# Patient Record
Sex: FEMALE | Race: OTHER | HISPANIC OR LATINO | URBAN - METROPOLITAN AREA
[De-identification: names, ages, dates, MRNs, and addresses within clinical notes are randomized per-mention and may not be internally consistent; named-entity substitution may affect disease eponyms.]

---

## 2018-02-19 ENCOUNTER — EMERGENCY (EMERGENCY)
Facility: HOSPITAL | Age: 23
LOS: 1 days | Discharge: HOME | End: 2018-02-19
Attending: EMERGENCY MEDICINE
Payer: COMMERCIAL

## 2018-02-19 VITALS
RESPIRATION RATE: 16 BRPM | HEART RATE: 76 BPM | SYSTOLIC BLOOD PRESSURE: 123 MMHG | OXYGEN SATURATION: 98 % | DIASTOLIC BLOOD PRESSURE: 84 MMHG | TEMPERATURE: 99 F

## 2018-02-19 VITALS — HEIGHT: 63 IN | WEIGHT: 130.07 LBS

## 2018-02-19 PROCEDURE — 99283 EMERGENCY DEPT VISIT LOW MDM: CPT

## 2018-02-19 PROCEDURE — 99282 EMERGENCY DEPT VISIT SF MDM: CPT

## 2018-02-19 NOTE — ED PROVIDER NOTE - OBJECTIVE STATEMENT
21 yo F with no Past Medical History that presents with panic attack and anxiety. Pt reports that she has had these issues that were undx'd as she never sought help for them. Today was having stress with mom and was on train then started having diff breathing and 21 yo F with no Past Medical History that presents with panic attack and anxiety. Pt reports that she has had these issues that were undx'd as she never sought help for them. Today was having stress with mom and was on train then started having diff breathing and started feeling impending doom so came to ED, since arrival symptoms resolved including diff breathing and symptoms of impending doom. mom with anxiety and pt about to graduate college and doesn't have plans for afterwards so under a lot of stress. No chest pain, SOB, abd pain, N/V/D, fevers, chills, headache, vision/hearing changes, drug use, smoking, drinking. No trauma, no travel, no rash, no urinary symptoms, no vaginal bleeding/discharge. LMP currently on.

## 2018-02-19 NOTE — ED ADULT TRIAGE NOTE - CHIEF COMPLAINT QUOTE
patient reports panic attack. symptoms resolved in ED patient reports panic attack. symptoms resolved in ED. patient denies any suicidal or homicidal ideations.

## 2018-02-19 NOTE — ED PROVIDER NOTE - MEDICAL DECISION MAKING DETAILS
21 yo F with panic and anxiety. no symptoms at this time. VSS. Will send home, Rec strongly to f/u with PMD and may need referral to Psych/therapy to deal with stress and anxiety. No meds given today. Return for worsening symptoms.

## 2018-02-19 NOTE — ED ADULT NURSE NOTE - CHIEF COMPLAINT QUOTE
patient reports panic attack. symptoms resolved in ED. patient denies any suicidal or homicidal ideations.